# Patient Record
Sex: MALE | Race: WHITE | Employment: FULL TIME | URBAN - METROPOLITAN AREA
[De-identification: names, ages, dates, MRNs, and addresses within clinical notes are randomized per-mention and may not be internally consistent; named-entity substitution may affect disease eponyms.]

---

## 2021-02-21 ENCOUNTER — HOSPITAL ENCOUNTER (EMERGENCY)
Age: 51
Discharge: HOME | End: 2021-02-21
Attending: EMERGENCY MEDICINE
Payer: COMMERCIAL

## 2021-02-21 ENCOUNTER — APPOINTMENT (OUTPATIENT)
Dept: GENERAL RADIOLOGY | Age: 51
End: 2021-02-21
Payer: COMMERCIAL

## 2021-02-21 VITALS
OXYGEN SATURATION: 97 % | HEART RATE: 98 BPM | DIASTOLIC BLOOD PRESSURE: 91 MMHG | RESPIRATION RATE: 19 BRPM | SYSTOLIC BLOOD PRESSURE: 145 MMHG | TEMPERATURE: 98.9 F

## 2021-02-21 DIAGNOSIS — S82.832A CLOSED FRACTURE OF DISTAL END OF LEFT FIBULA, UNSPECIFIED FRACTURE MORPHOLOGY, INITIAL ENCOUNTER: Primary | ICD-10-CM

## 2021-02-21 PROCEDURE — 6370000000 HC RX 637 (ALT 250 FOR IP): Performed by: EMERGENCY MEDICINE

## 2021-02-21 PROCEDURE — 99284 EMERGENCY DEPT VISIT MOD MDM: CPT

## 2021-02-21 PROCEDURE — 29515 APPLICATION SHORT LEG SPLINT: CPT

## 2021-02-21 PROCEDURE — 73610 X-RAY EXAM OF ANKLE: CPT

## 2021-02-21 RX ORDER — HYDROCODONE BITARTRATE AND ACETAMINOPHEN 5; 325 MG/1; MG/1
2 TABLET ORAL ONCE
Status: COMPLETED | OUTPATIENT
Start: 2021-02-21 | End: 2021-02-21

## 2021-02-21 RX ORDER — IBUPROFEN 400 MG/1
800 TABLET ORAL ONCE
Status: COMPLETED | OUTPATIENT
Start: 2021-02-21 | End: 2021-02-21

## 2021-02-21 RX ORDER — OXYCODONE HYDROCHLORIDE AND ACETAMINOPHEN 5; 325 MG/1; MG/1
1 TABLET ORAL EVERY 6 HOURS PRN
Qty: 12 TABLET | Refills: 0 | Status: SHIPPED | OUTPATIENT
Start: 2021-02-21 | End: 2021-02-22

## 2021-02-21 RX ADMIN — IBUPROFEN 800 MG: 400 TABLET, FILM COATED ORAL at 07:02

## 2021-02-21 RX ADMIN — HYDROCODONE BITARTRATE AND ACETAMINOPHEN 2 TABLET: 5; 325 TABLET ORAL at 07:02

## 2021-02-21 SDOH — HEALTH STABILITY: MENTAL HEALTH: HOW OFTEN DO YOU HAVE A DRINK CONTAINING ALCOHOL?: NEVER

## 2021-02-21 ASSESSMENT — PAIN DESCRIPTION - ORIENTATION: ORIENTATION: LEFT

## 2021-02-21 ASSESSMENT — PAIN SCALES - GENERAL: PAINLEVEL_OUTOF10: 3

## 2021-02-21 ASSESSMENT — PAIN DESCRIPTION - DESCRIPTORS: DESCRIPTORS: ACHING

## 2021-02-21 NOTE — ED NOTES
Splint placed and crutches given. Pt demonstrated use of crutches, however is unsteady on his crutches. Pt will be ok on crutches with practice. Pt tells MD that he has a knee ronquillo to use at home. Pt wheeled to awaiting car. Pt given d/c instructions and verbalized understanding that RX is at [facility] in [place] and to follow up with orthopedics.        Teressa Jensen RN  02/21/21 [facility], RN  02/21/21 8302

## 2021-02-21 NOTE — ED PROVIDER NOTES
[address] [facility]          ATTENDING PHYSICIAN NOTE       Date of evaluation: 2/21/2021    Chief Complaint     Fall (was drinking and slipped on ice) and Ankle Pain (left ankle, foot)      History of Present Illness     Roc Gutierrez is a 53 y.o. male who presents with complaints of left ankle pain, and inability to bear weight. The patient states that he had had some alcohol to drink last evening, and he was walking down the 3 patio steps to the backyard at around 10 or 11:00 at night, when he slipped on some ice. He cannot recall exactly how he fell, but believes that there was a \"weird twisting motion\" of his left foot, and he had immediate onset of pain with shortly thereafter developing swelling over primarily the lateral aspect of the left ankle. He denies any head trauma or loss of consciousness. He was able to hobble back into the house, but has had increasing pain, and has not been able to bear weight since that time. Patient states that he has had to crawl when he has had to go to the bathroom, as he has not been able to put any weight on his left foot. He currently describes the pain in his left ankle as being 3 out of 10 in intensity, although much worse whenever he attempts to bear weight, primarily over the lateral aspect of the ankle, worse with movement or attempted weightbearing. He denies any other sources of pain or injury from this fall. Review of Systems     Review of Systems   All other systems reviewed and are negative. Past Medical, Surgical, Family, and Social History     He has a past medical history of Sleep apnea. He has no past surgical history on file. His family history is not on file. He reports that he has never smoked. He has never used smokeless tobacco. He reports current alcohol use. He reports that he does not use drugs.     Medications     Previous Medications    No medications on file       Allergies     He has No Known Allergies. Physical Exam     INITIAL VITALS: BP: (!) 145/91, Temp: 98.9 °F (37.2 °C), Pulse: 98, Resp: 19, SpO2: 97 %     General: Well appearing, moderately obese gentleman. Uncomfortable appearing, but pleasantly conversational, and in NAD. HEENT: Head is atraumatic, normocephalic. Pupils are equal, round, and reactive to light. Extraocular muscles are intact. Conjunctivae are clear and moist. No redness or drainage from the eyes. Mask is in place. Neck: Supple, with full range of motion. Cardiovascular:  2+ radial pulses bilaterally. 2+ DP pulses bilaterally. Respiratory: Unlabored breathing with equal chest rise and fall. Skin: Warm and dry. Neuro: Alert and oriented x3. No focal neurologic deficits are noted. Extremities: Warm and well-perfused. There is edema and tenderness to palpation over the lateral aspect of the left ankle, primarily over the lateral malleolus and the distal fibula. No tenderness over the medial malleolus. No tenderness over the midfoot or forefoot, or at the base of the fifth metatarsal.  Toes are neurovascularly intact distally. Patient has no tenderness to palpation about the knee, with full range of motion of the knee. No other long bone or joint deformities, swelling, tenderness to palpation, limitation of range of motion by pain. Psych: The patient's mood and affect are generally within normal limits for their presentation. Diagnostic Results       RADIOLOGY:  XR ANKLE LEFT (MIN 3 VIEWS)   Final Result     Mildly displaced oblique fracture through the distal fibula. Overlying    soft tissue swelling. LABS:   No results found for this visit on 02/21/21. RECENT VITALS:  BP: (!) 145/91, Temp: 98.9 °F (37.2 °C), Pulse: 98, Resp: 19, SpO2: 97 %     Procedures       ED Course     Nursing Notes, Past Medical Hx, Past Surgical Hx, Social Hx, Allergies, and Family Hx were reviewed.     The patient was given the following medications:  Orders Placed This Encounter   Medications    ibuprofen (ADVIL;MOTRIN) tablet 800 mg    HYDROcodone-acetaminophen (NORCO) 5-325 MG per tablet 2 tablet    oxyCODONE-acetaminophen (PERCOCET) 5-325 MG per tablet     Sig: Take 1 tablet by mouth every 6 hours as needed for Pain for up to 12 doses. WARNING:  May cause drowsiness. May impair ability to operate vehicles or machinery. Do not use in combination with alcohol. Dispense:  12 tablet     Refill:  0       CONSULTS:  None    MEDICAL DECISION MAKING / ASSESSMENT / PLAN     Radha Pereira is a 53 y.o. male who presents with pain and swelling over the lateral aspect of the left ankle after a slip and fall on icy steps last night. He has not been able to bear weight since the injury. He had no head trauma, loss of consciousness, or any other injuries associated with this fall. Plain films do show a left distal tibia fracture, without significant displacement. The ankle has been splinted in the emergency department, and the patient is given self-care instructions and referral to orthopedic surgery as an outpatient. Clinical Impression     1. Closed fracture of distal end of left fibula, unspecified fracture morphology, initial encounter        Disposition     PATIENT REFERRED TO:  Marv Gates MD  [address] [facility]  [phone]    Call   to schedule followup appointment      DISCHARGE MEDICATIONS:  New Prescriptions    OXYCODONE-ACETAMINOPHEN (PERCOCET) 5-325 MG PER TABLET    Take 1 tablet by mouth every 6 hours as needed for Pain for up to 12 doses. WARNING:  May cause drowsiness. May impair ability to operate vehicles or machinery. Do not use in combination with alcohol. DISPOSITION discharged. (Please note that portions of this note were completed with voice recognition software.   Efforts were made to edit the dictations but occasionally words are mis-transcribed.)     Deion Lopez MD  02/21/21 0025

## 2021-02-26 ENCOUNTER — OFFICE VISIT (OUTPATIENT)
Dept: PRIMARY CARE CLINIC | Age: 51
End: 2021-02-26
Payer: COMMERCIAL

## 2021-02-26 DIAGNOSIS — Z01.818 PREOP EXAMINATION: Primary | ICD-10-CM

## 2021-02-26 PROCEDURE — 99211 OFF/OP EST MAY X REQ PHY/QHP: CPT | Performed by: NURSE PRACTITIONER

## 2021-02-26 PROCEDURE — G8428 CUR MEDS NOT DOCUMENT: HCPCS | Performed by: NURSE PRACTITIONER

## 2021-02-26 PROCEDURE — G8417 CALC BMI ABV UP PARAM F/U: HCPCS | Performed by: NURSE PRACTITIONER

## 2021-02-26 RX ORDER — TELMISARTAN 40 MG/1
40 TABLET ORAL DAILY
COMMUNITY

## 2021-02-26 NOTE — PROGRESS NOTES
Preoperative Screening for Elective Surgery/Invasive Procedures While COVID-19 present in the community     Have you had any of the following symptoms? No  o Fever, chills  o Cough  o Shortness of breath  o Muscle aches/pain  o Diarrhea  o Abdominal pain, nausea, vomiting  o Loss or decrease in taste and / or smell   Risk of Exposure  o Have you recently been hospitalized for COVID-19 or flu-like illness, if so when? No  o Recently diagnosed with COVID-19, if so when? No  o Recently tested for COVID-19, if so when? No  o Have you been in close contact with a person or family member who currently has or recently had COVID-23? If yes, when and in what context? No  o Do you live with anybody who in the last 14 days has had fever, chills, shortness of breath, muscle aches, flu-like illness? No  o Do you have any close contacts or family members who are currently in the hospital for COVID-19 or flu-like illness? No  If yes, assess recent close contact with this person. Indicate if the patient has a positive screen by answering yes to one or more of the above questions. Patients who test positive or screen positive prior to surgery or on the day of surgery should be evaluated in conjunction with the surgeon/proceduralist/anesthesiologist to determine the urgency of the procedure.

## 2021-02-26 NOTE — PROGRESS NOTES
Trena Loose    Age 53 y.o.    male    1970    MRN [id number]    3/1/2021  Arrival Time_____________  OR Time____________[address]     Procedure(s):  OPEN REDUCTION INTERNAL FIXATION FIBULA FRACTURE LEFT ANKLE                      General    Surgeon(s):  Marcelle Beer, DPM       Phone [phone] ([place])     [address]  Cell Work  _________________________________________________________________  _________________________________________________________________  _________________________________________________________________  _________________________________________________________________  _________________________________________________________________      PCP _____________________________ Phone_________________       H&P__________________Bringing    Chart            Epic  DOS     Called_______  EKG__________________Bringing    Chart            Epic  DOS     Called_______  LAB__________________ Bringing    Chart            Epic  DOS     Called_______  Cardiac Clearance_______Bringing    Chart            Epic      DOS       Called_______    Cardiologist________________________ Phone___________________________      ? Temple concerns / Waiver on Chart            PAT Communications_____________  ? Pre-op Instructions Given [place]          ______________________________  ? Directions to Surgery Center                          ______________________________  ? Transportation Home_______________      _______________________________  ?  Crutches/Walker__________________        _______________________________      ________Pre-op Orders   _______Transcribed    _______Wt.  ________Pharmacy          _______SCD  ______VTE     ______Beta Blocker  ________Consent             ________TED Bella Fritter

## 2021-02-27 LAB — SARS-COV-2: NOT DETECTED

## 2021-03-01 ENCOUNTER — ANESTHESIA EVENT (OUTPATIENT)
Dept: OPERATING ROOM | Age: 51
End: 2021-03-01
Payer: COMMERCIAL

## 2021-03-01 ENCOUNTER — HOSPITAL ENCOUNTER (OUTPATIENT)
Age: 51
Setting detail: OUTPATIENT SURGERY
Discharge: HOME | End: 2021-03-01
Attending: PODIATRIST | Admitting: PODIATRIST
Payer: COMMERCIAL

## 2021-03-01 ENCOUNTER — ANESTHESIA (OUTPATIENT)
Dept: OPERATING ROOM | Age: 51
End: 2021-03-01
Payer: COMMERCIAL

## 2021-03-01 VITALS
OXYGEN SATURATION: 97 % | RESPIRATION RATE: 12 BRPM | DIASTOLIC BLOOD PRESSURE: 68 MMHG | SYSTOLIC BLOOD PRESSURE: 113 MMHG

## 2021-03-01 VITALS
OXYGEN SATURATION: 95 % | HEART RATE: 74 BPM | SYSTOLIC BLOOD PRESSURE: 133 MMHG | TEMPERATURE: 97 F | WEIGHT: 315 LBS | RESPIRATION RATE: 18 BRPM | DIASTOLIC BLOOD PRESSURE: 86 MMHG | HEIGHT: 69 IN | BODY MASS INDEX: 46.65 KG/M2

## 2021-03-01 DIAGNOSIS — S82.822A CLOSED TORUS FRACTURE OF DISTAL END OF LEFT FIBULA, INITIAL ENCOUNTER: Primary | ICD-10-CM

## 2021-03-01 PROCEDURE — C1713 ANCHOR/SCREW BN/BN,TIS/BN: HCPCS | Performed by: PODIATRIST

## 2021-03-01 PROCEDURE — 7100000000 HC PACU RECOVERY - FIRST 15 MIN: Performed by: PODIATRIST

## 2021-03-01 PROCEDURE — 2580000003 HC RX 258: Performed by: ANESTHESIOLOGY

## 2021-03-01 PROCEDURE — 6360000002 HC RX W HCPCS: Performed by: PODIATRIST

## 2021-03-01 PROCEDURE — 3700000000 HC ANESTHESIA ATTENDED CARE: Performed by: PODIATRIST

## 2021-03-01 PROCEDURE — 2709999900 HC NON-CHARGEABLE SUPPLY: Performed by: PODIATRIST

## 2021-03-01 PROCEDURE — 2720000010 HC SURG SUPPLY STERILE: Performed by: PODIATRIST

## 2021-03-01 PROCEDURE — 2580000003 HC RX 258: Performed by: PODIATRIST

## 2021-03-01 PROCEDURE — 7100000011 HC PHASE II RECOVERY - ADDTL 15 MIN: Performed by: PODIATRIST

## 2021-03-01 PROCEDURE — 3600000004 HC SURGERY LEVEL 4 BASE: Performed by: PODIATRIST

## 2021-03-01 PROCEDURE — 7100000010 HC PHASE II RECOVERY - FIRST 15 MIN: Performed by: PODIATRIST

## 2021-03-01 PROCEDURE — 3600000014 HC SURGERY LEVEL 4 ADDTL 15MIN: Performed by: PODIATRIST

## 2021-03-01 PROCEDURE — 2500000003 HC RX 250 WO HCPCS: Performed by: NURSE ANESTHETIST, CERTIFIED REGISTERED

## 2021-03-01 PROCEDURE — 7100000001 HC PACU RECOVERY - ADDTL 15 MIN: Performed by: PODIATRIST

## 2021-03-01 PROCEDURE — 6370000000 HC RX 637 (ALT 250 FOR IP): Performed by: ANESTHESIOLOGY

## 2021-03-01 PROCEDURE — 6360000002 HC RX W HCPCS: Performed by: ANESTHESIOLOGY

## 2021-03-01 PROCEDURE — 3700000001 HC ADD 15 MINUTES (ANESTHESIA): Performed by: PODIATRIST

## 2021-03-01 PROCEDURE — 6360000002 HC RX W HCPCS: Performed by: NURSE ANESTHETIST, CERTIFIED REGISTERED

## 2021-03-01 DEVICE — IMPLANTABLE DEVICE
Type: IMPLANTABLE DEVICE | Site: FIBULA | Status: FUNCTIONAL
Brand: ORTHOLOC 3DI

## 2021-03-01 DEVICE — HEADED COMPRESSION SCREW
Type: IMPLANTABLE DEVICE | Site: FIBULA | Status: FUNCTIONAL
Brand: DART-FIRE

## 2021-03-01 DEVICE — IMPLANTABLE DEVICE
Type: IMPLANTABLE DEVICE | Site: FIBULA | Status: FUNCTIONAL
Brand: ORTHOLOC

## 2021-03-01 DEVICE — IMPLANTABLE DEVICE
Type: IMPLANTABLE DEVICE | Site: FIBULA | Status: FUNCTIONAL
Brand: ORTHOLOC 3DI PLATING SYSTEM

## 2021-03-01 RX ORDER — ONDANSETRON 2 MG/ML
INJECTION INTRAMUSCULAR; INTRAVENOUS PRN
Status: DISCONTINUED | OUTPATIENT
Start: 2021-03-01 | End: 2021-03-01 | Stop reason: SDUPTHER

## 2021-03-01 RX ORDER — MIDAZOLAM HYDROCHLORIDE 1 MG/ML
INJECTION INTRAMUSCULAR; INTRAVENOUS
Status: DISCONTINUED
Start: 2021-03-01 | End: 2021-03-01 | Stop reason: HOSPADM

## 2021-03-01 RX ORDER — HYDRALAZINE HYDROCHLORIDE 20 MG/ML
5 INJECTION INTRAMUSCULAR; INTRAVENOUS EVERY 10 MIN PRN
Status: DISCONTINUED | OUTPATIENT
Start: 2021-03-01 | End: 2021-03-01 | Stop reason: HOSPADM

## 2021-03-01 RX ORDER — DEXAMETHASONE SODIUM PHOSPHATE 10 MG/ML
INJECTION INTRAMUSCULAR; INTRAVENOUS PRN
Status: DISCONTINUED | OUTPATIENT
Start: 2021-03-01 | End: 2021-03-01 | Stop reason: SDUPTHER

## 2021-03-01 RX ORDER — LIDOCAINE HYDROCHLORIDE 20 MG/ML
INJECTION, SOLUTION INFILTRATION; PERINEURAL PRN
Status: DISCONTINUED | OUTPATIENT
Start: 2021-03-01 | End: 2021-03-01 | Stop reason: SDUPTHER

## 2021-03-01 RX ORDER — MAGNESIUM HYDROXIDE 1200 MG/15ML
LIQUID ORAL CONTINUOUS PRN
Status: COMPLETED | OUTPATIENT
Start: 2021-03-01 | End: 2021-03-01

## 2021-03-01 RX ORDER — MIDAZOLAM HYDROCHLORIDE 1 MG/ML
INJECTION INTRAMUSCULAR; INTRAVENOUS PRN
Status: DISCONTINUED | OUTPATIENT
Start: 2021-03-01 | End: 2021-03-01 | Stop reason: SDUPTHER

## 2021-03-01 RX ORDER — OXYCODONE HYDROCHLORIDE AND ACETAMINOPHEN 5; 325 MG/1; MG/1
2 TABLET ORAL PRN
Status: COMPLETED | OUTPATIENT
Start: 2021-03-01 | End: 2021-03-01

## 2021-03-01 RX ORDER — FENTANYL CITRATE 50 UG/ML
INJECTION, SOLUTION INTRAMUSCULAR; INTRAVENOUS
Status: DISCONTINUED
Start: 2021-03-01 | End: 2021-03-01 | Stop reason: HOSPADM

## 2021-03-01 RX ORDER — OXYCODONE HYDROCHLORIDE AND ACETAMINOPHEN 5; 325 MG/1; MG/1
1 TABLET ORAL PRN
Status: COMPLETED | OUTPATIENT
Start: 2021-03-01 | End: 2021-03-01

## 2021-03-01 RX ORDER — PROPOFOL 10 MG/ML
INJECTION, EMULSION INTRAVENOUS PRN
Status: DISCONTINUED | OUTPATIENT
Start: 2021-03-01 | End: 2021-03-01 | Stop reason: SDUPTHER

## 2021-03-01 RX ORDER — OXYCODONE HYDROCHLORIDE AND ACETAMINOPHEN 5; 325 MG/1; MG/1
1 TABLET ORAL EVERY 6 HOURS PRN
Qty: 24 TABLET | Refills: 0 | Status: SHIPPED | OUTPATIENT
Start: 2021-03-01 | End: 2021-03-07

## 2021-03-01 RX ORDER — SODIUM CHLORIDE, SODIUM LACTATE, POTASSIUM CHLORIDE, CALCIUM CHLORIDE 600; 310; 30; 20 MG/100ML; MG/100ML; MG/100ML; MG/100ML
INJECTION, SOLUTION INTRAVENOUS CONTINUOUS
Status: DISCONTINUED | OUTPATIENT
Start: 2021-03-01 | End: 2021-03-01 | Stop reason: HOSPADM

## 2021-03-01 RX ORDER — PROMETHAZINE HYDROCHLORIDE 25 MG/1
25 TABLET ORAL EVERY 6 HOURS PRN
Qty: 30 TABLET | Refills: 0 | Status: SHIPPED | OUTPATIENT
Start: 2021-03-01 | End: 2021-03-08

## 2021-03-01 RX ORDER — LABETALOL HYDROCHLORIDE 5 MG/ML
5 INJECTION, SOLUTION INTRAVENOUS EVERY 10 MIN PRN
Status: DISCONTINUED | OUTPATIENT
Start: 2021-03-01 | End: 2021-03-01 | Stop reason: HOSPADM

## 2021-03-01 RX ORDER — PROMETHAZINE HYDROCHLORIDE 25 MG/ML
6.25 INJECTION, SOLUTION INTRAMUSCULAR; INTRAVENOUS
Status: DISCONTINUED | OUTPATIENT
Start: 2021-03-01 | End: 2021-03-01 | Stop reason: HOSPADM

## 2021-03-01 RX ORDER — FENTANYL CITRATE 50 UG/ML
INJECTION, SOLUTION INTRAMUSCULAR; INTRAVENOUS PRN
Status: DISCONTINUED | OUTPATIENT
Start: 2021-03-01 | End: 2021-03-01 | Stop reason: SDUPTHER

## 2021-03-01 RX ORDER — ROCURONIUM BROMIDE 10 MG/ML
INJECTION, SOLUTION INTRAVENOUS PRN
Status: DISCONTINUED | OUTPATIENT
Start: 2021-03-01 | End: 2021-03-01 | Stop reason: SDUPTHER

## 2021-03-01 RX ORDER — SODIUM CHLORIDE 0.9 % (FLUSH) 0.9 %
10 SYRINGE (ML) INJECTION EVERY 12 HOURS SCHEDULED
Status: DISCONTINUED | OUTPATIENT
Start: 2021-03-01 | End: 2021-03-01 | Stop reason: HOSPADM

## 2021-03-01 RX ORDER — ONDANSETRON 2 MG/ML
4 INJECTION INTRAMUSCULAR; INTRAVENOUS PRN
Status: DISCONTINUED | OUTPATIENT
Start: 2021-03-01 | End: 2021-03-01 | Stop reason: HOSPADM

## 2021-03-01 RX ORDER — DIPHENHYDRAMINE HYDROCHLORIDE 50 MG/ML
12.5 INJECTION INTRAMUSCULAR; INTRAVENOUS
Status: DISCONTINUED | OUTPATIENT
Start: 2021-03-01 | End: 2021-03-01 | Stop reason: HOSPADM

## 2021-03-01 RX ORDER — MEPERIDINE HYDROCHLORIDE 50 MG/ML
12.5 INJECTION INTRAMUSCULAR; INTRAVENOUS; SUBCUTANEOUS EVERY 5 MIN PRN
Status: DISCONTINUED | OUTPATIENT
Start: 2021-03-01 | End: 2021-03-01 | Stop reason: HOSPADM

## 2021-03-01 RX ORDER — MORPHINE SULFATE 2 MG/ML
1 INJECTION, SOLUTION INTRAMUSCULAR; INTRAVENOUS EVERY 5 MIN PRN
Status: DISCONTINUED | OUTPATIENT
Start: 2021-03-01 | End: 2021-03-01 | Stop reason: HOSPADM

## 2021-03-01 RX ORDER — LIDOCAINE HYDROCHLORIDE 10 MG/ML
0.3 INJECTION, SOLUTION EPIDURAL; INFILTRATION; INTRACAUDAL; PERINEURAL
Status: DISCONTINUED | OUTPATIENT
Start: 2021-03-01 | End: 2021-03-01 | Stop reason: HOSPADM

## 2021-03-01 RX ORDER — MORPHINE SULFATE 2 MG/ML
2 INJECTION, SOLUTION INTRAMUSCULAR; INTRAVENOUS EVERY 5 MIN PRN
Status: DISCONTINUED | OUTPATIENT
Start: 2021-03-01 | End: 2021-03-01 | Stop reason: HOSPADM

## 2021-03-01 RX ORDER — SODIUM CHLORIDE 0.9 % (FLUSH) 0.9 %
10 SYRINGE (ML) INJECTION PRN
Status: DISCONTINUED | OUTPATIENT
Start: 2021-03-01 | End: 2021-03-01 | Stop reason: HOSPADM

## 2021-03-01 RX ADMIN — HYDROMORPHONE HYDROCHLORIDE 0.5 MG: 1 INJECTION, SOLUTION INTRAMUSCULAR; INTRAVENOUS; SUBCUTANEOUS at 15:29

## 2021-03-01 RX ADMIN — HYDROMORPHONE HYDROCHLORIDE 0.5 MG: 1 INJECTION, SOLUTION INTRAMUSCULAR; INTRAVENOUS; SUBCUTANEOUS at 15:23

## 2021-03-01 RX ADMIN — PROPOFOL 300 MG: 10 INJECTION, EMULSION INTRAVENOUS at 13:44

## 2021-03-01 RX ADMIN — ROCURONIUM BROMIDE 70 MG: 10 SOLUTION INTRAVENOUS at 13:44

## 2021-03-01 RX ADMIN — Medication 3000 MG: at 13:40

## 2021-03-01 RX ADMIN — DEXAMETHASONE SODIUM PHOSPHATE 10 MG: 10 INJECTION INTRAMUSCULAR; INTRAVENOUS at 13:50

## 2021-03-01 RX ADMIN — OXYCODONE AND ACETAMINOPHEN 1 TABLET: 5; 325 TABLET ORAL at 15:44

## 2021-03-01 RX ADMIN — ONDANSETRON 4 MG: 2 INJECTION INTRAMUSCULAR; INTRAVENOUS at 13:50

## 2021-03-01 RX ADMIN — LIDOCAINE HYDROCHLORIDE 60 MG: 20 INJECTION, SOLUTION INFILTRATION; PERINEURAL at 13:44

## 2021-03-01 RX ADMIN — SUGAMMADEX 200 MG: 100 INJECTION, SOLUTION INTRAVENOUS at 15:05

## 2021-03-01 RX ADMIN — FENTANYL CITRATE 50 MCG: 50 INJECTION INTRAMUSCULAR; INTRAVENOUS at 13:44

## 2021-03-01 RX ADMIN — SODIUM CHLORIDE, POTASSIUM CHLORIDE, SODIUM LACTATE AND CALCIUM CHLORIDE: 600; 310; 30; 20 INJECTION, SOLUTION INTRAVENOUS at 11:31

## 2021-03-01 RX ADMIN — MIDAZOLAM HYDROCHLORIDE 2 MG: 2 INJECTION, SOLUTION INTRAMUSCULAR; INTRAVENOUS at 13:40

## 2021-03-01 RX ADMIN — SUGAMMADEX 300 MG: 100 INJECTION, SOLUTION INTRAVENOUS at 14:56

## 2021-03-01 ASSESSMENT — PULMONARY FUNCTION TESTS
PIF_VALUE: 22
PIF_VALUE: 23
PIF_VALUE: 1
PIF_VALUE: 22
PIF_VALUE: 23
PIF_VALUE: 11
PIF_VALUE: 23
PIF_VALUE: 1
PIF_VALUE: 23
PIF_VALUE: 8
PIF_VALUE: 22
PIF_VALUE: 23
PIF_VALUE: 22
PIF_VALUE: 22
PIF_VALUE: 23
PIF_VALUE: 22
PIF_VALUE: 18
PIF_VALUE: 1
PIF_VALUE: 21
PIF_VALUE: 22
PIF_VALUE: 22
PIF_VALUE: 23
PIF_VALUE: 23
PIF_VALUE: 15
PIF_VALUE: 8
PIF_VALUE: 22
PIF_VALUE: 20
PIF_VALUE: 29
PIF_VALUE: 22
PIF_VALUE: 22
PIF_VALUE: 23
PIF_VALUE: 22
PIF_VALUE: 23
PIF_VALUE: 22
PIF_VALUE: 23
PIF_VALUE: 5
PIF_VALUE: 23

## 2021-03-01 ASSESSMENT — PAIN DESCRIPTION - DESCRIPTORS
DESCRIPTORS: ACHING;CONSTANT

## 2021-03-01 ASSESSMENT — PAIN DESCRIPTION - LOCATION
LOCATION: ANKLE

## 2021-03-01 ASSESSMENT — PAIN SCALES - GENERAL
PAINLEVEL_OUTOF10: 6
PAINLEVEL_OUTOF10: 7
PAINLEVEL_OUTOF10: 5
PAINLEVEL_OUTOF10: 0

## 2021-03-01 ASSESSMENT — PAIN DESCRIPTION - PAIN TYPE
TYPE: SURGICAL PAIN

## 2021-03-01 ASSESSMENT — PAIN DESCRIPTION - ORIENTATION
ORIENTATION: LEFT

## 2021-03-01 NOTE — ANESTHESIA PRE PROCEDURE
Department of Anesthesiology  Preprocedure Note       Name:  Vara Canavan   Age:  53 y.o.  :  1970                                          MRN:  [id number]         Date:  3/1/2021      Surgeon: Laure Jimenes):  Yulissa Patricio, DPM    Procedure: Procedure(s):  OPEN REDUCTION INTERNAL FIXATION FIBULA FRACTURE LEFT ANKLE -SLEEP APNEA-    Medications prior to admission:   Prior to Admission medications    Medication Sig Start Date End Date Taking? Authorizing Provider   oxyCODONE-Acetaminophen (PERCOCET PO) Take by mouth as needed   Yes Historical Provider, MD   telmisartan (MICARDIS) 40 MG tablet Take 40 mg by mouth daily   Yes Historical Provider, MD       Current medications:    Current Facility-Administered Medications   Medication Dose Route Frequency Provider Last Rate Last Admin    ceFAZolin (ANCEF) 3000 mg in dextrose 5 % 100 mL IVPB  3,000 mg Intravenous On Call to [facility] [address], DP        lactated ringers infusion   Intravenous Continuous Jeanie Bettencourt  mL/hr at 21 1131 New Bag at 21 1131    sodium chloride flush 0.9 % injection 10 mL  10 mL Intravenous 2 times per day Jeanie Bettencourt MD        sodium chloride flush 0.9 % injection 10 mL  10 mL Intravenous PRN Jeanie Bettencourt MD        lidocaine PF 1 % injection 0.3 mL  0.3 mL Intradermal Once PRN Jeanie Bettencourt MD           Allergies:  No Known Allergies    Problem List:  There is no problem list on file for this patient.       Past Medical History:        Diagnosis Date    Hypertension     Sleep apnea     uses CPAP       Past Surgical History:        Procedure Laterality Date    ANUS SURGERY      anal fissure    CARPAL TUNNEL RELEASE Bilateral        Social History:    Social History     Tobacco Use    Smoking status: Never Smoker    Smokeless tobacco: Never Used   Substance Use Topics    Alcohol use: Yes     Frequency: Never     Comment: 0-7 drinks per week                                Counseling given: Not Answered Vital Signs (Current):   Vitals:    02/26/21 1043 03/01/21 1121   BP:  (!) 153/92   Pulse:  62   Resp:  20   Temp:  97.6 °F (36.4 °C)   TempSrc:  Temporal   SpO2:  98%   Weight: (!) 323 lb (146.5 kg) (!) 323 lb (146.5 kg)   Height: 5' 9\" (1.753 m) 5' 9\" (1.753 m)                                              BP Readings from Last 3 Encounters:   03/01/21 (!) 153/92   02/21/21 (!) 145/91       NPO Status: Time of last liquid consumption: 0530(6 oz water  with  percocet)                        Time of last solid consumption: 1930                        Date of last liquid consumption: 02/28/21                        Date of last solid food consumption: 02/28/21    BMI:   Wt Readings from Last 3 Encounters:   03/01/21 (!) 323 lb (146.5 kg)     Body mass index is 47.7 kg/m². CBC: No results found for: WBC, RBC, HGB, HCT, MCV, RDW, PLT    CMP: No results found for: NA, K, CL, CO2, BUN, CREATININE, GFRAA, AGRATIO, LABGLOM, GLUCOSE, PROT, CALCIUM, BILITOT, ALKPHOS, AST, ALT    POC Tests: No results for input(s): POCGLU, POCNA, POCK, POCCL, POCBUN, POCHEMO, POCHCT in the last 72 hours.     Coags: No results found for: PROTIME, INR, APTT    HCG (If Applicable): No results found for: PREGTESTUR, PREGSERUM, HCG, HCGQUANT     ABGs: No results found for: PHART, PO2ART, KCH6OHG, JRA9OUE, BEART, W3OFCASF     Type & Screen (If Applicable):  No results found for: LABABO, LABRH    Drug/Infectious Status (If Applicable):  No results found for: HIV, HEPCAB    COVID-19 Screening (If Applicable):   Lab Results   Component Value Date    COVID19 Not Detected 02/26/2021         Anesthesia Evaluation  Patient summary reviewed no history of anesthetic complications:   Airway: Mallampati: II  TM distance: >3 FB   Neck ROM: full  Mouth opening: > = 3 FB Dental: normal exam         Pulmonary:Negative Pulmonary ROS and normal exam  breath sounds clear to auscultation  (+) sleep apnea:                             Cardiovascular:Negative CV ROS Exercise tolerance: good (>4 METS),   (+) hypertension:,         Rhythm: regular  Rate: normal                    Neuro/Psych:   Negative Neuro/Psych ROS              GI/Hepatic/Renal: Neg GI/Hepatic/Renal ROS  (+) morbid obesity          Endo/Other: Negative Endo/Other ROS                    Abdominal:           Vascular: negative vascular ROS. Pre-Operative Diagnosis: Closed torus fracture of distal end of left fibula, initial encounter [S82.822A]    53 y.o.   BMI:  Body mass index is 47.7 kg/m². Vitals:    02/26/21 1043 03/01/21 1121   BP:  (!) 153/92   Pulse:  62   Resp:  20   Temp:  97.6 °F (36.4 °C)   TempSrc:  Temporal   SpO2:  98%   Weight: (!) 323 lb (146.5 kg) (!) 323 lb (146.5 kg)   Height: 5' 9\" (1.753 m) 5' 9\" (1.753 m)       No Known Allergies    Social History     Tobacco Use    Smoking status: Never Smoker    Smokeless tobacco: Never Used   Substance Use Topics    Alcohol use: Yes     Frequency: Never     Comment: 0-7 drinks per week       LABS:    CBC  No results found for: WBC, HGB, HCT, PLT  RENAL  No results found for: NA, K, CL, CO2, BUN, CREATININE, GLUCOSE  COAGS  No results found for: PROTIME, INR, APTT          Anesthesia Plan      general     ASA 3     (I discussed with the patient the risks and benefits of PIV, anesthesia, IV Narcotics, PACU. All questions were answered the patient agrees with the plan and wishes to proceed)  Induction: intravenous.                           Cammie Blair MD   3/1/2021

## 2021-03-01 NOTE — H&P
I examined the patient. I reviewed the patient's pre-op history and physical, no changes are noted.     Gallito Gabriel DPM

## 2021-03-01 NOTE — BRIEF OP NOTE
Brief Postoperative Note      Patient: Wagner Zacarias  YOB: 1970  MRN: [id number]    Date of Procedure: 3/1/2021    Pre-Op Diagnosis: FIBULAR FX LEFT ANKLE    Post-Op Diagnosis: Same       Procedure(s):  OPEN REDUCTION INTERNAL FIXATION FIBULA FRACTURE LEFT ANKLE -SLEEP APNEA-    Surgeon(s):  Carlos Eduardo Mon DPM    Assistant:  Surgical Assistant: Kyleigh Shepherd    Anesthesia: General    Estimated Blood Loss (mL): Minimal    Complications: None    Specimens:   * No specimens in log *    Implants:  Implant Name Type Inv.  Item Serial No.  Lot No. LRB No. Used Action   SCREW BNE SM L18MM DIA4MM DK ISAÍAS TI CANC ANK ST SELF DRL  SCREW BNE SM L18MM DIA4MM DK ISAÍAS TI CANC ANK ST SELF DRL  [email or web address] [facility]  Left 1 Implanted   PLATE BNE [id number] L LAT FIBULAR ORTHOLOC 3DI  PLATE BNE [id number] L LAT FIBULAR ORTHOLOC 3DI  [email or web address] [facility]  Left 1 Implanted   SCREW BNE L12MM DIA3.5MM TRAM TI POLYAX NIXON FULL THRD FOR  SCREW BNE L12MM DIA3.5MM TRAM TI POLYAX NIXON FULL THRD FOR  [email or web address] [facility]  Left 2 Implanted   SCREW BNE L10MM DIA3.5MM TRAM TI POLYAX NIXON FULL THRD FOR  SCREW BNE L10MM DIA3.5MM TRAM TI POLYAX NIXON FULL THRD FOR  [email or web address] [facility]  Left 2 Implanted   SCREW BNE L14MM DIA3.5MM TRAM FT ANK TI NIXON FULL THRD FOR  SCREW BNE L14MM DIA3.5MM TRAM FT ANK TI NIXON FULL THRD FOR  [email or web address] [facility]  Left 2 Implanted   SCREW BNE L14MM DIA3.5MM TRAM FT ANK TI NONLOCKING FULL  SCREW BNE L14MM DIA3.5MM TRAM FT ANK TI NONLOCKING FULL  [email or web address] [facility]  Left 1 Implanted         Drains: * No LDAs found *    Findings: approximately 4mm of shortening to the fibula    Electronically signed by Carlos Eduardo Mon DPM on 3/1/2021 at 2:45 PM

## 2021-03-02 NOTE — OP NOTE
Tho                                OPERATIVE REPORT    PATIENT NAME: Jeffery England                   :        1970  MED REC NO:   [id number]                          ROOM:  ACCOUNT NO:   [de-identified]                           ADMIT DATE: 2021  PROVIDER:     Marylin Oneill DPM    DATE OF PROCEDURE:  2021    PREOPERATIVE DIAGNOSIS:  Fibular fracture, left ankle. POSTOPERATIVE DIAGNOSIS:  Fibular fracture, left ankle. PROCEDURE:  Open reduction and internal fixation, fibular fracture, left  ankle. SURGEON:  Marylin Oneill DPM    OPERATIVE INDICATIONS:  This patient presented to my outpatient clinic  this past Thursday with severe left ankle pain. The Saturday prior, he  had fallen down a couple of steps outside of his house. He was  evaluated the following morning at the ER and subsequent x-rays  demonstrated a slightly displaced long oblique fracture of the distal  fibula. There was slight widening of the medial gutter. The operative  and nonoperative treatment options were presented to the patient and the  patient elected for surgical treatment. OPERATIVE REPORT:  The patient was brought to the operating room and  placed in the supine position with an IV intact for sedation. General  anesthesia was provided by the Anesthesia Department of [facility]. The patient received preoperative sciatic nerve block  in preop holding. The patient's left lower extremity was then prepped  and draped in the usual aseptic manner. The lower extremity was  exsanguinated and a pneumatic tourniquet at the left thigh was inflated  for hemostasis throughout the procedure. Attention was then directed to the lateral aspect of the left ankle. No  signs of infection were present. There were no open lesions or fracture  blisters noted.   A slightly curved incision over the lateral malleolus  was made and extended proximally above the fracture. This was deepened  down to the level of the periosteal tissue being careful to avoid all  neurovascular structures. They were either bovied or retracted. A  periosteal incision was made delivering the fracture plainly into view. There was a very slight comminution at the proximal aspect of the  fracture. The fracture was noted to be quite long and it shortened  approximately 4 mm. Gentle distraction was applied to the left foot at the ankle. This was  performed for at least a minute to relax the soft tissue. With the  assistance of a reduction clamp, the fracture was then manipulated back  into anatomic alignment. This was checked under mini C-arm and  excellent restoration of the fibular length was noted with anatomic  reduction of the fracture, as well as anatomic alignment of the ankle,  especially at the medial gutter. A K-wire was directed from posterior to anterior across the fracture. A  4.0 x 18 mm compression screw was then inserted across the fracture. Excellent compression of the fracture was noted upon tightening the  screw. Next a 77-mm fibular plate was applied to the lateral aspect of  the distal fibula. A total of six locking screws and one nonlocking  screw were used to secure the plate to the distal fibula. The ankle  joint was then viewed under mini C-arm and again anatomic alignment of  the ankle joint was noted. The wound was then copiously irrigated with normal saline. The  periosteal tissue was then repaired using 2-0 Vicryl. The skin was then  repaired using 3-0 Vicryl and 4-0 Monocryl. A ZipLine was applied over  the incision site. A mildly compressive dressing was applied to the  patient's left ankle. Upon release of the tourniquet, immediate warmth and perfusion was noted  to have returned to all digits of the left foot shortly after.     There were no complications encountered during the procedure. All  materials and injectables were as above. There were no specimens sent  to Pathology.   Estimated blood loss for the procedure was minimal.        Jarred Ahn DPM    D: 03/01/2021 14:59:30       T: 03/01/2021 18:17:31     [email or web address]  Job#: [id number]     Doc#: [id number]    CC:

## 2021-03-02 NOTE — ANESTHESIA POSTPROCEDURE EVALUATION
Department of Anesthesiology  Postprocedure Note    Patient: Joel Abreu  MRN: [id number]  YOB: 1970  Date of evaluation: 3/1/2021  Time:  8:02 PM     Procedure Summary     Date: 03/01/21 Room / Location: [place] [address] / [facility]    Anesthesia Start: 0918 Anesthesia Stop: 8131    Procedure: OPEN REDUCTION INTERNAL FIXATION FIBULA FRACTURE LEFT ANKLE -SLEEP APNEA- (Left Leg Lower) Diagnosis:       Closed torus fracture of distal end of left fibula, initial encounter      (FIBULAR FX LEFT ANKLE)    Surgeons: James Rosenthal DPM Responsible Provider: Debra Loredo MD    Anesthesia Type: general ASA Status: 3          Anesthesia Type: general    Josh Phase I: Josh Score: 10    Josh Phase II: Josh Score: 10    Last vitals: Reviewed and per EMR flowsheets. Anesthesia Post Evaluation    Patient location during evaluation: PACU  Patient participation: complete - patient participated  Level of consciousness: awake and alert  Airway patency: patent  Nausea & Vomiting: no nausea and no vomiting  Complications: no  Cardiovascular status: blood pressure returned to baseline  Respiratory status: acceptable  Hydration status: euvolemic  Comments: VSS on transfer to phase 2 recovery. No anesthetic complications.

## 2021-03-30 ENCOUNTER — NURSE ONLY (OUTPATIENT)
Dept: PRIMARY CARE CLINIC | Age: 51
End: 2021-03-30
Payer: COMMERCIAL

## 2021-03-30 DIAGNOSIS — Z01.818 PREOP EXAMINATION: Primary | ICD-10-CM

## 2021-03-30 PROCEDURE — 99211 OFF/OP EST MAY X REQ PHY/QHP: CPT | Performed by: NURSE PRACTITIONER

## 2021-03-31 LAB — SARS-COV-2: DETECTED

## 2021-06-02 ENCOUNTER — CLINICAL DOCUMENTATION (OUTPATIENT)
Dept: OTHER | Age: 51
End: 2021-06-02

## (undated) DEVICE — STANDARD HYPODERMIC NEEDLE,POLYPROPYLENE HUB: Brand: MONOJECT

## (undated) DEVICE — PLATE TACK

## (undated) DEVICE — PADDING CAST W4INXL4YD NONSTERILE COT RAYON MICROPLEATED

## (undated) DEVICE — GLOVE SURG SZ 75 L12IN FNGR THK94MIL STD WHT LTX FREE

## (undated) DEVICE — GLOVE SURG SZ 65 L12IN FNGR THK79MIL GRN LTX FREE

## (undated) DEVICE — GLOVE SURG SZ 8 L12IN FNGR THK94MIL STD WHT LTX FREE

## (undated) DEVICE — DRESSING ADPTC N ADH 3X3IN

## (undated) DEVICE — SYRINGE MED 10ML LUERLOCK TIP W/O SFTY DISP

## (undated) DEVICE — DRAPE EQUIP C ARM MINI 10000100] TIDI PRODUCTS INC]

## (undated) DEVICE — INTENDED FOR TISSUE SEPARATION, AND OTHER PROCEDURES THAT REQUIRE A SHARP SURGICAL BLADE TO PUNCTURE OR CUT.: Brand: BARD-PARKER ® STAINLESS STEEL BLADES

## (undated) DEVICE — SUTURE VCRL SZ 3-0 L18IN ABSRB UD L26MM SH 1/2 CIR J864D

## (undated) DEVICE — NEEDLE HYPO 25GA L1.5IN BLU POLYPR HUB S STL REG BVL STR

## (undated) DEVICE — GLOVE,SURG,SENSICARE,ALOE,LF,PF,7: Brand: MEDLINE

## (undated) DEVICE — SET ADMIN PRIMING 7ML L30IN 7.35LB 20 GTT 2ND RLER CLMP

## (undated) DEVICE — SYRINGE MED 10ML POLYPR GEN PURP FLAT TOP LUERLOCK TIP

## (undated) DEVICE — CATHETER IV 20GA L1.25IN PNK FEP SFTY STR HUB RADPQ DISP

## (undated) DEVICE — COUNTERSINK: Brand: DART-FIRE

## (undated) DEVICE — DRILL BIT

## (undated) DEVICE — GLOVE SURG SZ 65 L12IN FNGR THK94MIL STD WHT LTX FREE

## (undated) DEVICE — SOLUTION IV 1000ML LAC RINGERS PH 6.5 INJ USP VIAFLX PLAS

## (undated) DEVICE — ZIMMER® STERILE DISPOSABLE TOURNIQUET CUFF WITH PLC, DUAL PORT, SINGLE BLADDER, 30 IN. (76 CM)

## (undated) DEVICE — SUTURE VCRL SZ 2-0 L18IN ABSRB UD CT-1 L36MM 1/2 CIR J839D

## (undated) DEVICE — SET GRAV VENT NVENT CK VLV 3 NDL FREE PRT 10 GTT

## (undated) DEVICE — 3M™ TEGADERM™ TRANSPARENT FILM DRESSING FRAME STYLE, 1624W, 2-3/8 IN X 2-3/4 IN (6 CM X 7 CM), 100/CT 4CT/CASE: Brand: 3M™ TEGADERM™

## (undated) DEVICE — K-WIRE BLUNT/TROCAR
Type: IMPLANTABLE DEVICE | Site: FIBULA | Status: NON-FUNCTIONAL
Removed: 2021-03-01

## (undated) DEVICE — BANDAGE,GAUZE,CONFORMING,3"X75",STRL,LF: Brand: MEDLINE

## (undated) DEVICE — GOWN,SIRUS,NON REINFRCD,LARGE,SET IN SL: Brand: MEDLINE

## (undated) DEVICE — PACK EXTREMITY XR

## (undated) DEVICE — GOWN,SIRUS,POLYRNF,SETINSLV,L,20/CS: Brand: MEDLINE

## (undated) DEVICE — ZIP 8I SURGICAL SKIN CLOSURE DEVICE: Brand: ZIP 8I SURGICAL SKIN CLOSURE DEVICE

## (undated) DEVICE — SOLUTION IV IRRIG POUR BRL 0.9% SODIUM CHL 2F7124

## (undated) DEVICE — SUTURE MCRYL SZ 4-0 L18IN ABSRB UD L19MM PS-2 3/8 CIR PRIM Y496G

## (undated) DEVICE — ZIMMER® STERILE DISPOSABLE TOURNIQUET CUFF WITH PLC, DUAL PORT, SINGLE BLADDER, 34 IN. (86 CM)